# Patient Record
Sex: FEMALE | Race: WHITE | NOT HISPANIC OR LATINO | Employment: FULL TIME | URBAN - METROPOLITAN AREA
[De-identification: names, ages, dates, MRNs, and addresses within clinical notes are randomized per-mention and may not be internally consistent; named-entity substitution may affect disease eponyms.]

---

## 2020-12-03 ENCOUNTER — HOSPITAL ENCOUNTER (OUTPATIENT)
Dept: RADIOLOGY | Facility: OTHER | Age: 45
Discharge: HOME | End: 2020-12-03
Attending: INTERNAL MEDICINE
Payer: MEDICAID

## 2020-12-03 DIAGNOSIS — Z12.31 ENCOUNTER FOR SCREENING MAMMOGRAM FOR MALIGNANT NEOPLASM OF BREAST: ICD-10-CM

## 2020-12-03 PROCEDURE — 77063 BREAST TOMOSYNTHESIS BI: CPT | Mod: 26,,, | Performed by: RADIOLOGY

## 2020-12-03 PROCEDURE — 77063 MAMMO DIGITAL SCREENING BILAT WITH TOMO: ICD-10-PCS | Mod: 26,,, | Performed by: RADIOLOGY

## 2020-12-03 PROCEDURE — 77067 SCR MAMMO BI INCL CAD: CPT | Mod: TC

## 2020-12-03 PROCEDURE — 77067 SCR MAMMO BI INCL CAD: CPT | Mod: 26,,, | Performed by: RADIOLOGY

## 2020-12-03 PROCEDURE — 77067 MAMMO DIGITAL SCREENING BILAT WITH TOMO: ICD-10-PCS | Mod: 26,,, | Performed by: RADIOLOGY

## 2020-12-17 ENCOUNTER — HOSPITAL ENCOUNTER (OUTPATIENT)
Dept: RADIOLOGY | Facility: OTHER | Age: 45
Discharge: HOME | End: 2020-12-17
Attending: INTERNAL MEDICINE
Payer: MEDICAID

## 2020-12-17 DIAGNOSIS — R92.8 ABNORMAL MAMMOGRAM: ICD-10-CM

## 2020-12-17 PROCEDURE — 77061 BREAST TOMOSYNTHESIS UNI: CPT | Mod: TC,RT

## 2020-12-17 PROCEDURE — 77065 MAMMO DIGITAL DIAGNOSTIC RIGHT WITH TOMO: ICD-10-PCS | Mod: 26,RT,, | Performed by: RADIOLOGY

## 2020-12-17 PROCEDURE — 77061 BREAST TOMOSYNTHESIS UNI: CPT | Mod: 26,RT,, | Performed by: RADIOLOGY

## 2020-12-17 PROCEDURE — 77065 DX MAMMO INCL CAD UNI: CPT | Mod: 26,RT,, | Performed by: RADIOLOGY

## 2020-12-17 PROCEDURE — 76642 ULTRASOUND BREAST LIMITED: CPT | Mod: 26,RT,, | Performed by: RADIOLOGY

## 2020-12-17 PROCEDURE — 77061 MAMMO DIGITAL DIAGNOSTIC RIGHT WITH TOMO: ICD-10-PCS | Mod: 26,RT,, | Performed by: RADIOLOGY

## 2020-12-17 PROCEDURE — 76642 ULTRASOUND BREAST LIMITED: CPT | Mod: TC,RT

## 2020-12-17 PROCEDURE — 77065 DX MAMMO INCL CAD UNI: CPT | Mod: TC,RT

## 2020-12-17 PROCEDURE — 76642 US BREAST RIGHT LIMITED: ICD-10-PCS | Mod: 26,RT,, | Performed by: RADIOLOGY

## 2020-12-31 ENCOUNTER — HOSPITAL ENCOUNTER (OUTPATIENT)
Dept: RADIOLOGY | Facility: OTHER | Age: 45
Discharge: HOME | End: 2020-12-31
Attending: INTERNAL MEDICINE
Payer: MEDICAID

## 2020-12-31 DIAGNOSIS — R92.8 ABNORMAL MAMMOGRAM: ICD-10-CM

## 2020-12-31 DIAGNOSIS — N63.0 BREAST MASS: Primary | ICD-10-CM

## 2020-12-31 PROCEDURE — 88305 TISSUE EXAM BY PATHOLOGIST: ICD-10-PCS | Mod: 26,,, | Performed by: PATHOLOGY

## 2020-12-31 PROCEDURE — 25000003 PHARM REV CODE 250: Performed by: INTERNAL MEDICINE

## 2020-12-31 PROCEDURE — 19081 BX BREAST 1ST LESION STRTCTC: CPT | Mod: RT

## 2020-12-31 PROCEDURE — 19081 BX BREAST 1ST LESION STRTCTC: CPT | Mod: RT,,, | Performed by: RADIOLOGY

## 2020-12-31 PROCEDURE — 88305 TISSUE EXAM BY PATHOLOGIST: CPT | Performed by: PATHOLOGY

## 2020-12-31 PROCEDURE — 19081 MAMMO BREAST STEREOTACTIC BREAST BIOPSY RIGHT: ICD-10-PCS | Mod: RT,,, | Performed by: RADIOLOGY

## 2020-12-31 PROCEDURE — 88305 TISSUE EXAM BY PATHOLOGIST: CPT | Mod: 26,,, | Performed by: PATHOLOGY

## 2020-12-31 RX ORDER — LIDOCAINE HYDROCHLORIDE AND EPINEPHRINE 10; 10 MG/ML; UG/ML
5 INJECTION, SOLUTION INFILTRATION; PERINEURAL ONCE
Status: DISCONTINUED | OUTPATIENT
Start: 2020-12-31 | End: 2021-01-01 | Stop reason: HOSPADM

## 2020-12-31 RX ORDER — LIDOCAINE HYDROCHLORIDE AND EPINEPHRINE 10; 10 MG/ML; UG/ML
5 INJECTION, SOLUTION INFILTRATION; PERINEURAL ONCE
Status: COMPLETED | OUTPATIENT
Start: 2020-12-31 | End: 2020-12-31

## 2020-12-31 RX ORDER — LIDOCAINE HYDROCHLORIDE AND EPINEPHRINE 20; 10 MG/ML; UG/ML
20 INJECTION, SOLUTION INFILTRATION; PERINEURAL ONCE
Status: COMPLETED | OUTPATIENT
Start: 2020-12-31 | End: 2020-12-31

## 2020-12-31 RX ADMIN — LIDOCAINE HYDROCHLORIDE AND EPINEPHRINE 20 ML: 20; 10 INJECTION, SOLUTION INFILTRATION; PERINEURAL at 01:12

## 2020-12-31 RX ADMIN — LIDOCAINE HYDROCHLORIDE,EPINEPHRINE BITARTRATE 5 ML: 10; .01 INJECTION, SOLUTION INFILTRATION; PERINEURAL at 01:12

## 2021-01-04 ENCOUNTER — TELEPHONE (OUTPATIENT)
Dept: RADIOLOGY | Facility: OTHER | Age: 46
End: 2021-01-04

## 2021-01-04 LAB
FINAL PATHOLOGIC DIAGNOSIS: NORMAL
GROSS: NORMAL

## 2021-07-22 ENCOUNTER — HOSPITAL ENCOUNTER (OUTPATIENT)
Dept: RADIOLOGY | Facility: OTHER | Age: 46
Discharge: HOME | End: 2021-07-22
Attending: INTERNAL MEDICINE
Payer: MEDICAID

## 2021-07-22 DIAGNOSIS — R92.8 ABNORMAL MAMMOGRAM: ICD-10-CM

## 2021-07-22 PROCEDURE — 77065 DX MAMMO INCL CAD UNI: CPT | Mod: 26,RT,, | Performed by: RADIOLOGY

## 2021-07-22 PROCEDURE — 77061 BREAST TOMOSYNTHESIS UNI: CPT | Mod: TC,RT

## 2021-07-22 PROCEDURE — 77065 MAMMO DIGITAL DIAGNOSTIC RIGHT WITH TOMO: ICD-10-PCS | Mod: 26,RT,, | Performed by: RADIOLOGY

## 2021-07-22 PROCEDURE — 77061 BREAST TOMOSYNTHESIS UNI: CPT | Mod: 26,RT,, | Performed by: RADIOLOGY

## 2021-07-22 PROCEDURE — 77061 MAMMO DIGITAL DIAGNOSTIC RIGHT WITH TOMO: ICD-10-PCS | Mod: 26,RT,, | Performed by: RADIOLOGY

## 2022-03-11 ENCOUNTER — HOSPITAL ENCOUNTER (OUTPATIENT)
Dept: RADIOLOGY | Facility: OTHER | Age: 47
Discharge: HOME | End: 2022-03-11
Attending: INTERNAL MEDICINE
Payer: MEDICAID

## 2022-03-11 DIAGNOSIS — Z12.31 ENCOUNTER FOR SCREENING MAMMOGRAM FOR MALIGNANT NEOPLASM OF BREAST: ICD-10-CM

## 2022-03-11 PROCEDURE — 77067 SCR MAMMO BI INCL CAD: CPT | Mod: 26,,, | Performed by: RADIOLOGY

## 2022-03-11 PROCEDURE — 77063 MAMMO DIGITAL SCREENING BILAT WITH TOMO: ICD-10-PCS | Mod: 26,,, | Performed by: RADIOLOGY

## 2022-03-11 PROCEDURE — 77067 SCR MAMMO BI INCL CAD: CPT | Mod: TC

## 2022-03-11 PROCEDURE — 77063 BREAST TOMOSYNTHESIS BI: CPT | Mod: 26,,, | Performed by: RADIOLOGY

## 2022-03-11 PROCEDURE — 77067 MAMMO DIGITAL SCREENING BILAT WITH TOMO: ICD-10-PCS | Mod: 26,,, | Performed by: RADIOLOGY

## 2022-12-26 PROBLEM — S82.892A CLOSED AVULSION FRACTURE OF LEFT ANKLE: Status: ACTIVE | Noted: 2022-12-26

## 2022-12-26 PROBLEM — T14.90XA TRAUMA: Status: ACTIVE | Noted: 2022-12-26

## 2022-12-27 ENCOUNTER — TELEPHONE (OUTPATIENT)
Dept: ORTHOPEDICS | Facility: CLINIC | Age: 47
End: 2022-12-27
Payer: MEDICAID

## 2022-12-27 NOTE — TELEPHONE ENCOUNTER
"----- Message from Enrrique Campbell sent at 12/27/2022 10:35 AM CST -----  Regarding: Consult/Advisory    Name Of Caller:Self    Contact Preference?:[phone]           Provider Name:   Does patient feel the need to be seen today?           What is the nature of the call?:Pt is calling because she went to the ER and was informed that pt has a left ankle fracture           Additional Notes:  "Thank you for all that you do for our patients'"     "

## 2023-01-05 ENCOUNTER — OFFICE VISIT (OUTPATIENT)
Dept: ORTHOPEDICS | Facility: CLINIC | Age: 48
End: 2023-01-05
Payer: MEDICAID

## 2023-01-05 DIAGNOSIS — S92.155A CLOSED NONDISPLACED AVULSION FRACTURE OF LEFT TALUS, INITIAL ENCOUNTER: Primary | ICD-10-CM

## 2023-01-05 PROCEDURE — 3077F PR MOST RECENT SYSTOLIC BLOOD PRESSURE >= 140 MM HG: ICD-10-PCS | Mod: CPTII,,,

## 2023-01-05 PROCEDURE — 99214 OFFICE O/P EST MOD 30 MIN: CPT | Mod: PBBFAC,PN

## 2023-01-05 PROCEDURE — 3008F PR BODY MASS INDEX (BMI) DOCUMENTED: ICD-10-PCS | Mod: CPTII,,,

## 2023-01-05 PROCEDURE — 3080F DIAST BP >= 90 MM HG: CPT | Mod: CPTII,,,

## 2023-01-05 PROCEDURE — 3080F PR MOST RECENT DIASTOLIC BLOOD PRESSURE >= 90 MM HG: ICD-10-PCS | Mod: CPTII,,,

## 2023-01-05 PROCEDURE — 99999 PR PBB SHADOW E&M-EST. PATIENT-LVL IV: CPT | Mod: PBBFAC,,,

## 2023-01-05 PROCEDURE — 3008F BODY MASS INDEX DOCD: CPT | Mod: CPTII,,,

## 2023-01-05 PROCEDURE — 1159F PR MEDICATION LIST DOCUMENTED IN MEDICAL RECORD: ICD-10-PCS | Mod: CPTII,,,

## 2023-01-05 PROCEDURE — 1159F MED LIST DOCD IN RCRD: CPT | Mod: CPTII,,,

## 2023-01-05 PROCEDURE — 99999 PR PBB SHADOW E&M-EST. PATIENT-LVL IV: ICD-10-PCS | Mod: PBBFAC,,,

## 2023-01-05 PROCEDURE — 3077F SYST BP >= 140 MM HG: CPT | Mod: CPTII,,,

## 2023-01-05 PROCEDURE — 99204 PR OFFICE/OUTPT VISIT, NEW, LEVL IV, 45-59 MIN: ICD-10-PCS | Mod: S$PBB,,,

## 2023-01-05 PROCEDURE — 99204 OFFICE O/P NEW MOD 45 MIN: CPT | Mod: S$PBB,,,

## 2023-01-05 RX ORDER — FLUTICASONE PROPIONATE 50 MCG
SPRAY, SUSPENSION (ML) NASAL
COMMUNITY

## 2023-01-05 RX ORDER — TIZANIDINE 4 MG/1
4 TABLET ORAL 2 TIMES DAILY
COMMUNITY
Start: 2022-12-19

## 2023-01-05 RX ORDER — ALBUTEROL SULFATE 90 UG/1
AEROSOL, METERED RESPIRATORY (INHALATION)
COMMUNITY

## 2023-01-05 RX ORDER — NYSTATIN 100000 [USP'U]/G
POWDER TOPICAL
COMMUNITY

## 2023-01-05 RX ORDER — PROMETHAZINE HYDROCHLORIDE 25 MG/1
25 TABLET ORAL DAILY PRN
COMMUNITY
Start: 2022-12-19

## 2023-01-05 RX ORDER — ZOLPIDEM TARTRATE 10 MG/1
TABLET ORAL
COMMUNITY

## 2023-01-05 RX ORDER — POLYETHYLENE GLYCOL 3350 17 G/17G
POWDER, FOR SOLUTION ORAL
COMMUNITY

## 2023-01-05 NOTE — PROGRESS NOTES
Patient ID: Justino Drew is a 47 y.o. female    Fracture of the Left Ankle      History of Present Illness:    Justino Drew presents to clinic for left ankle fracture. Patient states on 12/25/2022 she slipped on ham juice while in her kitchen and fell onto her left ankle. She then went to [facility] and was found to have a small avulsion fracture of her dorsal talus. She was placed in splint, given tramadol, and told to f/u with orthopedics. The pain started 2 weeks ago and is improving. Pain is located over (points to) medial left ankle. She reports that the pain is a 6 /10 sharp pain toda. The pain is affecting ADLs and limiting desired level of activity. Denies numbness, tingling, radiation and inability to bear weight. States she is able to bear toe touch weight.     Trial of tramadol and ibuprofen with some improvement.     Ambulating: cane  Diabetic: no  Smoking: no  Hx of DVT/PE: no    PAST MEDICAL HISTORY: No past medical history on file.  PAST SURGICAL HISTORY:   Past Surgical History:   Procedure Laterality Date    CHOLECYSTECTOMY       FAMILY HISTORY: No family history on file.  SOCIAL HISTORY:   Social History     Occupational History    Not on file   Tobacco Use    Smoking status: Every Day     Packs/day: 0.50     Types: Cigarettes    Smokeless tobacco: Never   Substance and Sexual Activity    Alcohol use: Not Currently    Drug use: Not Currently    Sexual activity: Not on file        MEDICATIONS:   Current Outpatient Medications:     albuterol (PROVENTIL/VENTOLIN HFA) 90 mcg/actuation inhaler, albuterol sulfate HFA 90 mcg/actuation aerosol inhaler  USE 2 PUFFS BY MOUTH EVERY 6 HOURS AS NEEDED FOR WHEEZING AND FOR SHORTNESS OF BREATH, Disp: , Rfl:     fluticasone propionate (FLONASE) 50 mcg/actuation nasal spray, fluticasone propionate 50 mcg/actuation nasal spray,suspension  USE 1 SPRAY IN EACH NOSTRIL DAILY, Disp: , Rfl:     ibuprofen (ADVIL,MOTRIN) 800 MG tablet, Take 1 tablet (800 mg  total) by mouth every 6 (six) hours as needed for Pain., Disp: 20 tablet, Rfl: 0    levothyroxine (SYNTHROID, LEVOTHROID) 175 MCG tablet, Take 175 mcg by mouth once daily., Disp: , Rfl:     loratadine (CLARITIN) 10 mg tablet, Take 10 mg by mouth once daily., Disp: , Rfl:     montelukast (SINGULAIR) 10 mg tablet, Take 10 mg by mouth every evening., Disp: , Rfl:     nystatin (MYCOSTATIN) powder, Nyamyc 100,000 unit/gram topical powder, Disp: , Rfl:     polyethylene glycol (GLYCOLAX) 17 gram/dose powder, polyethylene glycol 3350 17 gram/dose oral powder, Disp: , Rfl:     promethazine (PHENERGAN) 25 MG tablet, Take 25 mg by mouth daily as needed., Disp: , Rfl:     tiZANidine (ZANAFLEX) 4 MG tablet, Take 4 mg by mouth 2 (two) times daily., Disp: , Rfl:     zolpidem (AMBIEN) 10 mg Tab, zolpidem 10 mg tablet, Disp: , Rfl:     albuterol-ipratropium (DUO-NEB) 2.5 mg-0.5 mg/3 mL nebulizer solution, Take 3 mLs by nebulization every 6 (six) hours as needed for Wheezing. Rescue, Disp: 1 Box, Rfl: 0  ALLERGIES: Review of patient's allergies indicates:  No Known Allergies      Physical Exam     There were no vitals filed for this visit.  Alert and oriented to person, place and time. No acute distress. Well-groomed, not ill appearing. Pupils round and reactive, normal respiratory effort, no audible wheezing.     left Foot and Ankle Exam    INSPECTION:        Gait:    antalgic     Scars:   None     Color:   Normal      Atrophy:  None      Heel / Toe Walking: Unable to assess  Moderate swelling  No abrasions or ecchymosis    ALIGNMENT:    Hindfoot  Normal  Forefoot  Normal                        TENDERNESS:       Sinus tarsi:    none    Syndesmosis:    none    ATFL:     pos           Fibula:     none  Peroneal tendons:   none        Talonavicular:    pos   Anterior tibialis:   none   Anterolateral joint line:  none  Anteromedial joint  line:  none          Deltoid:    pos    Malleolus:    none    PTT:     none    Navicular:    none       Achilles    none  Calcaneal Insertion   none  Retrocalcaneus   none        RANGE OF MOTION:     Ankle DF/PF:    15/45        Eversion/Inversion:   15/unable to assess     Midfoot ABD/ADD:   10/10     First MTP DF/PF:   60/25    STRENGTH    Peroneals:  5/5  Anterior tibialis: 4/5  Posterior tibialis: 4/5  Gastroc-soleus: 5/5  EHL:   5/5  FHL:   5/5             SPECIAL TESTS:    Anterior drawer:   Normal      (C-W contralateral side)          Forced DF/ER: No pain at syndesmosis.  Mid-leg squeeze  No pain at syndesmosis  Forced DF:  No pain anterior joint line.    Forced PF:  No pain posterior ankle.   Forced INV:  No pain lateral  Forced EV:  No pain medial   Montejos sign: Normal ankle plantar flexion.   Resisted peroneal No subluxation or pain  1st-2nd MT toggle No pain at Lisfranc      Imaging:     Left ankle  X-rays ordered/reviewed by me showing small avulsion fracture to dorsal talus.There is no obvious malalignment. No evidence of masses, lesions or foreign bodies.     Assessment & Plan    Closed nondisplaced avulsion fracture of left talus, initial encounter      I made the decision to obtain old records of the patient including previous notes and imaging. New imaging was ordered today of the extremity or extremities evaluated. I independently reviewed and interpreted the radiographs and/or MRIs/CT scan today as well as prior imaging.    We discussed at length different treatment options including conservative vs surgical management. These include anti-inflammatories, acetaminophen, rest, ice, heat, formal physical therapy including strengthening and stretching exercises, home exercise programs, injections, dry needling, and finally surgical intervention.      Will treat non-operatively with immobilization and imaging. Continue walking boot x 2 weeks, then gradually wean. RTC 3-4 weeks for repeat imaging.  She does endorse deltoid pain, if this persists can get MRI vs CT to further evaluate.     Cont tramadol or ibuprofen prn  Ice compress to the affected area 2-3x a day for 15-20 minutes as needed for pain management  Consider CT vs MRI if pain is refractory to conservative treatment    Follow up: 4 weeks  X-rays next visit: left ankle    All questions were answered and patient is agreeable to the above plan.

## 2023-01-09 VITALS
SYSTOLIC BLOOD PRESSURE: 169 MMHG | DIASTOLIC BLOOD PRESSURE: 106 MMHG | BODY MASS INDEX: 53.92 KG/M2 | HEART RATE: 73 BPM | WEIGHT: 293 LBS | HEIGHT: 62 IN

## 2023-01-26 ENCOUNTER — OFFICE VISIT (OUTPATIENT)
Dept: ORTHOPEDICS | Facility: CLINIC | Age: 48
End: 2023-01-26
Payer: MEDICAID

## 2023-01-26 VITALS
WEIGHT: 293 LBS | BODY MASS INDEX: 53.92 KG/M2 | SYSTOLIC BLOOD PRESSURE: 205 MMHG | DIASTOLIC BLOOD PRESSURE: 125 MMHG | HEART RATE: 99 BPM | HEIGHT: 62 IN

## 2023-01-26 DIAGNOSIS — S92.155D CLOSED NONDISPLACED AVULSION FRACTURE OF LEFT TALUS WITH ROUTINE HEALING, SUBSEQUENT ENCOUNTER: ICD-10-CM

## 2023-01-26 DIAGNOSIS — S93.421D SPRAIN OF DELTOID LIGAMENT OF RIGHT ANKLE, SUBSEQUENT ENCOUNTER: Primary | ICD-10-CM

## 2023-01-26 PROCEDURE — 3008F PR BODY MASS INDEX (BMI) DOCUMENTED: ICD-10-PCS | Mod: CPTII,,,

## 2023-01-26 PROCEDURE — 99213 OFFICE O/P EST LOW 20 MIN: CPT | Mod: S$PBB,,,

## 2023-01-26 PROCEDURE — 1159F MED LIST DOCD IN RCRD: CPT | Mod: CPTII,,,

## 2023-01-26 PROCEDURE — 3080F DIAST BP >= 90 MM HG: CPT | Mod: CPTII,,,

## 2023-01-26 PROCEDURE — 3008F BODY MASS INDEX DOCD: CPT | Mod: CPTII,,,

## 2023-01-26 PROCEDURE — 3077F PR MOST RECENT SYSTOLIC BLOOD PRESSURE >= 140 MM HG: ICD-10-PCS | Mod: CPTII,,,

## 2023-01-26 PROCEDURE — 99999 PR PBB SHADOW E&M-EST. PATIENT-LVL III: CPT | Mod: PBBFAC,,,

## 2023-01-26 PROCEDURE — 99213 PR OFFICE/OUTPT VISIT, EST, LEVL III, 20-29 MIN: ICD-10-PCS | Mod: S$PBB,,,

## 2023-01-26 PROCEDURE — 3077F SYST BP >= 140 MM HG: CPT | Mod: CPTII,,,

## 2023-01-26 PROCEDURE — 3080F PR MOST RECENT DIASTOLIC BLOOD PRESSURE >= 90 MM HG: ICD-10-PCS | Mod: CPTII,,,

## 2023-01-26 PROCEDURE — 99999 PR PBB SHADOW E&M-EST. PATIENT-LVL III: ICD-10-PCS | Mod: PBBFAC,,,

## 2023-01-26 PROCEDURE — 99213 OFFICE O/P EST LOW 20 MIN: CPT | Mod: PBBFAC,PN

## 2023-01-26 PROCEDURE — 1159F PR MEDICATION LIST DOCUMENTED IN MEDICAL RECORD: ICD-10-PCS | Mod: CPTII,,,

## 2023-01-26 NOTE — PROGRESS NOTES
Patient ID: Justino Drew is a 47 y.o. female    Pain of the Left Ankle      History of Present Illness:    Justino Drew presents to clinic for left ankle fracture. Patient states on 12/25/2022 she slipped on ham juice while in her kitchen and fell onto her left ankle. She then went to [facility] and was found to have a small avulsion fracture of her dorsal talus. She was placed in splint, given tramadol, and told to f/u with orthopedics. The pain started 2 weeks ago and is improving. Pain is located over (points to) medial left ankle. She reports that the pain is a 6 /10 sharp pain toda. The pain is affecting ADLs and limiting desired level of activity. Denies numbness, tingling, radiation and inability to bear weight. States she is able to bear toe touch weight.     Trial of tramadol and ibuprofen with some improvement.     Ambulating: cane  Diabetic: no  Smoking: no  Hx of DVT/PE: no    ____________________________________________________________________    Interval history 01/26/2023 : Patient returns today for follow up of left ankle fracture. States she seems to be okay if she is ambulating in the boot, but if she overdoes it or certain movements worsen her pain. States her pain is more medial, describes it as achey. She takes Ibuprofen prn.         PAST MEDICAL HISTORY: No past medical history on file.  PAST SURGICAL HISTORY:   Past Surgical History:   Procedure Laterality Date    CHOLECYSTECTOMY       FAMILY HISTORY: No family history on file.  SOCIAL HISTORY:   Social History     Occupational History    Not on file   Tobacco Use    Smoking status: Every Day     Packs/day: 0.50     Types: Cigarettes    Smokeless tobacco: Never   Substance and Sexual Activity    Alcohol use: Not Currently    Drug use: Not Currently    Sexual activity: Not on file        MEDICATIONS:   Current Outpatient Medications:     albuterol (PROVENTIL/VENTOLIN HFA) 90 mcg/actuation inhaler, albuterol sulfate HFA 90  mcg/actuation aerosol inhaler  USE 2 PUFFS BY MOUTH EVERY 6 HOURS AS NEEDED FOR WHEEZING AND FOR SHORTNESS OF BREATH, Disp: , Rfl:     fluticasone propionate (FLONASE) 50 mcg/actuation nasal spray, fluticasone propionate 50 mcg/actuation nasal spray,suspension  USE 1 SPRAY IN EACH NOSTRIL DAILY, Disp: , Rfl:     ibuprofen (ADVIL,MOTRIN) 800 MG tablet, Take 1 tablet (800 mg total) by mouth every 6 (six) hours as needed for Pain., Disp: 20 tablet, Rfl: 0    levothyroxine (SYNTHROID, LEVOTHROID) 175 MCG tablet, Take 175 mcg by mouth once daily., Disp: , Rfl:     loratadine (CLARITIN) 10 mg tablet, Take 10 mg by mouth once daily., Disp: , Rfl:     montelukast (SINGULAIR) 10 mg tablet, Take 10 mg by mouth every evening., Disp: , Rfl:     nystatin (MYCOSTATIN) powder, Nyamyc 100,000 unit/gram topical powder, Disp: , Rfl:     polyethylene glycol (GLYCOLAX) 17 gram/dose powder, polyethylene glycol 3350 17 gram/dose oral powder, Disp: , Rfl:     promethazine (PHENERGAN) 25 MG tablet, Take 25 mg by mouth daily as needed., Disp: , Rfl:     tiZANidine (ZANAFLEX) 4 MG tablet, Take 4 mg by mouth 2 (two) times daily., Disp: , Rfl:     zolpidem (AMBIEN) 10 mg Tab, zolpidem 10 mg tablet, Disp: , Rfl:     albuterol-ipratropium (DUO-NEB) 2.5 mg-0.5 mg/3 mL nebulizer solution, Take 3 mLs by nebulization every 6 (six) hours as needed for Wheezing. Rescue, Disp: 1 Box, Rfl: 0  ALLERGIES: Review of patient's allergies indicates:  No Known Allergies      Physical Exam     Vitals:    01/26/23 1436   BP: (!) 205/125   Pulse: 99     Alert and oriented to person, place and time. No acute distress. Well-groomed, not ill appearing. Pupils round and reactive, normal respiratory effort, no audible wheezing.     left Foot and Ankle Exam    INSPECTION:        Gait:    antalgic     Scars:   None     Color:   Normal      Atrophy:  None      No abrasions or ecchymosis    ALIGNMENT:    Hindfoot  Normal  Forefoot  Normal                         TENDERNESS:       Sinus tarsi:    none    Syndesmosis:    none    ATFL:     neg           Fibula:     none  Peroneal tendons:   none        Talonavicular:    pos   Anterior tibialis:   none   Anterolateral joint line:  none  Anteromedial joint line:  none          Deltoid:    pos    Malleolus:    none    PTT:     none    Navicular:    none       Achilles    none  Calcaneal Insertion   none  Retrocalcaneus   none        RANGE OF MOTION:     Ankle DF/PF:    15/45        Eversion/Inversion:   15/45     Midfoot ABD/ADD:   10/10     First MTP DF/PF:   60/25    STRENGTH    Peroneals:  5/5  Anterior tibialis: 5/5  Posterior tibialis: 5/5  Gastroc-soleus: 5/5  EHL:   5/5  FHL:   5/5             SPECIAL TESTS:    Anterior drawer:   Normal      (C-W contralateral side)          Forced DF/ER: No pain at syndesmosis.  Mid-leg squeeze  No pain at syndesmosis  Forced DF:  No pain anterior joint line.    Forced PF:  No pain posterior ankle.   Forced INV:  No pain lateral  Forced EV:  No pain medial   Montejos sign: Normal ankle plantar flexion.   Resisted peroneal No subluxation or pain  1st-2nd MT toggle No pain at Lisfranc      Imaging:     Left ankle  X-rays ordered/reviewed by me showing small avulsion fracture to dorsal talus.There is no obvious malalignment. No evidence of masses, lesions or foreign bodies.     Assessment & Plan    Sprain of deltoid ligament of right ankle, subsequent encounter    Closed nondisplaced avulsion fracture of left talus with routine healing, subsequent encounter        I made the decision to obtain old records of the patient including previous notes and imaging. New imaging was ordered today of the extremity or extremities evaluated. I independently reviewed and interpreted the radiographs and/or MRIs/CT scan today as well as prior imaging.    We discussed at length different treatment options including conservative vs surgical management. These include anti-inflammatories,  acetaminophen, rest, ice, heat, formal physical therapy including strengthening and stretching exercises, home exercise programs, injections, dry needling, and finally surgical intervention.      Patient states mild intermittent continued medial ankle pain, discussed likely deltoid sprain. No instability noted on exam, full strength and ROM, mild TTP at deltoid. Due to BMI she would not be surgical candidate, no advanced imaging at this time. May stop boot and use ankle aircast.     Cont ibuprofen prn  Aircast stirup to left ankle, may remove for bathing and laying down  Ice compress to the affected area 2-3x a day for 15-20 minutes as needed for pain management    Follow up: 6-8 weeks  X-rays next visit: none    All questions were answered and patient is agreeable to the above plan.

## 2023-03-28 ENCOUNTER — OFFICE VISIT (OUTPATIENT)
Dept: ORTHOPEDICS | Facility: CLINIC | Age: 48
End: 2023-03-28
Payer: MEDICAID

## 2023-03-28 VITALS
WEIGHT: 293 LBS | BODY MASS INDEX: 53.92 KG/M2 | HEART RATE: 75 BPM | HEIGHT: 62 IN | SYSTOLIC BLOOD PRESSURE: 202 MMHG | DIASTOLIC BLOOD PRESSURE: 96 MMHG

## 2023-03-28 DIAGNOSIS — S93.421D SPRAIN OF DELTOID LIGAMENT OF RIGHT ANKLE, SUBSEQUENT ENCOUNTER: Primary | ICD-10-CM

## 2023-03-28 PROCEDURE — 1159F PR MEDICATION LIST DOCUMENTED IN MEDICAL RECORD: ICD-10-PCS | Mod: CPTII,,,

## 2023-03-28 PROCEDURE — 3080F DIAST BP >= 90 MM HG: CPT | Mod: CPTII,,,

## 2023-03-28 PROCEDURE — 99213 OFFICE O/P EST LOW 20 MIN: CPT | Mod: S$PBB,,,

## 2023-03-28 PROCEDURE — 3080F PR MOST RECENT DIASTOLIC BLOOD PRESSURE >= 90 MM HG: ICD-10-PCS | Mod: CPTII,,,

## 2023-03-28 PROCEDURE — 3077F SYST BP >= 140 MM HG: CPT | Mod: CPTII,,,

## 2023-03-28 PROCEDURE — 99213 OFFICE O/P EST LOW 20 MIN: CPT | Mod: PBBFAC,PN

## 2023-03-28 PROCEDURE — 3077F PR MOST RECENT SYSTOLIC BLOOD PRESSURE >= 140 MM HG: ICD-10-PCS | Mod: CPTII,,,

## 2023-03-28 PROCEDURE — 3008F BODY MASS INDEX DOCD: CPT | Mod: CPTII,,,

## 2023-03-28 PROCEDURE — 3008F PR BODY MASS INDEX (BMI) DOCUMENTED: ICD-10-PCS | Mod: CPTII,,,

## 2023-03-28 PROCEDURE — 99213 PR OFFICE/OUTPT VISIT, EST, LEVL III, 20-29 MIN: ICD-10-PCS | Mod: S$PBB,,,

## 2023-03-28 PROCEDURE — 1159F MED LIST DOCD IN RCRD: CPT | Mod: CPTII,,,

## 2023-03-28 PROCEDURE — 99999 PR PBB SHADOW E&M-EST. PATIENT-LVL III: CPT | Mod: PBBFAC,,,

## 2023-03-28 PROCEDURE — 99999 PR PBB SHADOW E&M-EST. PATIENT-LVL III: ICD-10-PCS | Mod: PBBFAC,,,

## 2023-03-28 NOTE — PROGRESS NOTES
Patient ID: Justino Drew is a 47 y.o. female    Pain of the Left Foot      History of Present Illness:    Justino Drew presents to clinic for left ankle fracture. Patient states on 12/25/2022 she slipped on ham juice while in her kitchen and fell onto her left ankle. She then went to [facility] and was found to have a small avulsion fracture of her dorsal talus. She was placed in splint, given tramadol, and told to f/u with orthopedics. The pain started 2 weeks ago and is improving. Pain is located over (points to) medial left ankle. She reports that the pain is a 6 /10 sharp pain toda. The pain is affecting ADLs and limiting desired level of activity. Denies numbness, tingling, radiation and inability to bear weight. States she is able to bear toe touch weight.     Trial of tramadol and ibuprofen with some improvement.     Ambulating: cane  Diabetic: no  Smoking: no  Hx of DVT/PE: no    ____________________________________________________________________    Interval history 1/26/2023 : Patient returns today for follow up of left ankle fracture. States she seems to be okay if she is ambulating in the boot, but if she overdoes it or certain movements worsen her pain. States her pain is more medial, describes it as achey. She takes Ibuprofen prn.       ____________________________________________________________________    Interval history 3/28/2023: Patient returns today for follow up of left ankle. States she rolled her ankle about 2-3 weeks ago and wore the boot for a week, was doing well prior to this. She was unable to tolerate aircast brace. She has no other concerns today.         PAST MEDICAL HISTORY: No past medical history on file.  PAST SURGICAL HISTORY:   Past Surgical History:   Procedure Laterality Date    CHOLECYSTECTOMY       FAMILY HISTORY: No family history on file.  SOCIAL HISTORY:   Social History     Occupational History    Not on file   Tobacco Use    Smoking status: Former      Packs/day: 0.50     Types: Cigarettes     Quit date: 6/15/2022     Years since quittin.7    Smokeless tobacco: Never   Substance and Sexual Activity    Alcohol use: Not Currently    Drug use: Not Currently    Sexual activity: Not on file        MEDICATIONS:   Current Outpatient Medications:     albuterol (PROVENTIL/VENTOLIN HFA) 90 mcg/actuation inhaler, albuterol sulfate HFA 90 mcg/actuation aerosol inhaler  USE 2 PUFFS BY MOUTH EVERY 6 HOURS AS NEEDED FOR WHEEZING AND FOR SHORTNESS OF BREATH, Disp: , Rfl:     fluticasone propionate (FLONASE) 50 mcg/actuation nasal spray, fluticasone propionate 50 mcg/actuation nasal spray,suspension  USE 1 SPRAY IN EACH NOSTRIL DAILY, Disp: , Rfl:     ibuprofen (ADVIL,MOTRIN) 800 MG tablet, Take 1 tablet (800 mg total) by mouth every 6 (six) hours as needed for Pain., Disp: 20 tablet, Rfl: 0    levothyroxine (SYNTHROID, LEVOTHROID) 175 MCG tablet, Take 175 mcg by mouth once daily., Disp: , Rfl:     loratadine (CLARITIN) 10 mg tablet, Take 10 mg by mouth once daily., Disp: , Rfl:     montelukast (SINGULAIR) 10 mg tablet, Take 10 mg by mouth every evening., Disp: , Rfl:     nystatin (MYCOSTATIN) powder, Nyamyc 100,000 unit/gram topical powder, Disp: , Rfl:     polyethylene glycol (GLYCOLAX) 17 gram/dose powder, polyethylene glycol 3350 17 gram/dose oral powder, Disp: , Rfl:     promethazine (PHENERGAN) 25 MG tablet, Take 25 mg by mouth daily as needed., Disp: , Rfl:     tiZANidine (ZANAFLEX) 4 MG tablet, Take 4 mg by mouth 2 (two) times daily., Disp: , Rfl:     zolpidem (AMBIEN) 10 mg Tab, zolpidem 10 mg tablet, Disp: , Rfl:     albuterol-ipratropium (DUO-NEB) 2.5 mg-0.5 mg/3 mL nebulizer solution, Take 3 mLs by nebulization every 6 (six) hours as needed for Wheezing. Rescue, Disp: 1 Box, Rfl: 0  ALLERGIES: Review of patient's allergies indicates:  No Known Allergies      Physical Exam     Vitals:    23 1439   BP: (!) 202/96   Pulse: 75       Alert and oriented to person,  place and time. No acute distress. Well-groomed, not ill appearing. Pupils round and reactive, normal respiratory effort, no audible wheezing.     left Foot and Ankle Exam    INSPECTION:        Gait:    Normal      Scars:   None     Color:   Normal      Atrophy:  None      No abrasions or ecchymosis    ALIGNMENT:    Hindfoot  Normal  Forefoot  Normal                        TENDERNESS:       Sinus tarsi:    none    Syndesmosis:    none    ATFL:     neg           Fibula:     none  Peroneal tendons:   none        Talonavicular:    pos   Anterior tibialis:   none   Anterolateral joint line:  none  Anteromedial joint line:  none          Deltoid:    none    Malleolus:    none    PTT:     none    Navicular:    none       Achilles    none  Calcaneal Insertion   none  Retrocalcaneus   none        RANGE OF MOTION:     Ankle DF/PF:    15/45        Eversion/Inversion:   15/45     Midfoot ABD/ADD:   10/10     First MTP DF/PF:   60/25    STRENGTH    Peroneals:  5/5  Anterior tibialis: 5/5  Posterior tibialis: 5/5  Gastroc-soleus: 5/5  EHL:   5/5  FHL:   5/5             SPECIAL TESTS:    Anterior drawer:   Normal      (C-W contralateral side)          Forced DF/ER: No pain at syndesmosis.  Mid-leg squeeze  No pain at syndesmosis  Forced DF:  No pain anterior joint line.    Forced PF:  No pain posterior ankle.   Forced INV:  No pain lateral  Forced EV:  No pain medial   Montejos sign: Normal ankle plantar flexion.   Resisted peroneal No subluxation or pain  1st-2nd MT toggle No pain at Lisfranc      Imaging:     Left ankle  X-rays ordered/reviewed by me showing small avulsion fracture to dorsal talus.There is no obvious malalignment. No evidence of masses, lesions or foreign bodies.     Assessment & Plan    Sprain of deltoid ligament of right ankle, subsequent encounter      I made the decision to obtain old records of the patient including previous notes and imaging. New imaging was ordered today of the extremity or extremities  evaluated. I independently reviewed and interpreted the radiographs and/or MRIs/CT scan today as well as prior imaging.    We discussed at length different treatment options including conservative vs surgical management. These include anti-inflammatories, acetaminophen, rest, ice, heat, formal physical therapy including strengthening and stretching exercises, home exercise programs, injections, dry needling, and finally surgical intervention.      Patient states mild intermittent continued medial ankle pain, nontender on exam today. Able to ambulate unassisted with normal gait. Able to bear all weight on ankle with no pain. Patient declined formal PT. F/U prn.     Follow up: prn   X-rays next visit: none    All questions were answered and patient is agreeable to the above plan.

## 2024-03-07 DIAGNOSIS — Z12.31 ENCOUNTER FOR SCREENING MAMMOGRAM FOR MALIGNANT NEOPLASM OF BREAST: Primary | ICD-10-CM

## 2024-03-08 ENCOUNTER — TELEPHONE (OUTPATIENT)
Dept: SURGERY | Facility: CLINIC | Age: 49
End: 2024-03-08
Payer: MEDICAID

## 2024-03-08 NOTE — TELEPHONE ENCOUNTER
Called patient reference to a referral to  Ambulatory Colorectal Surgery for colon cancer screening,Patient refused.

## 2024-04-16 ENCOUNTER — HOSPITAL ENCOUNTER (OUTPATIENT)
Dept: RADIOLOGY | Facility: HOSPITAL | Age: 49
Discharge: HOME | End: 2024-04-16
Attending: INTERNAL MEDICINE
Payer: MEDICAID

## 2024-04-16 DIAGNOSIS — Z12.31 ENCOUNTER FOR SCREENING MAMMOGRAM FOR MALIGNANT NEOPLASM OF BREAST: ICD-10-CM

## 2024-04-16 PROCEDURE — 77067 SCR MAMMO BI INCL CAD: CPT | Mod: 26,,, | Performed by: RADIOLOGY

## 2024-04-16 PROCEDURE — 77067 SCR MAMMO BI INCL CAD: CPT | Mod: TC

## 2024-04-16 PROCEDURE — 77063 BREAST TOMOSYNTHESIS BI: CPT | Mod: 26,,, | Performed by: RADIOLOGY

## 2025-03-20 ENCOUNTER — TELEPHONE (OUTPATIENT)
Dept: GASTROENTEROLOGY | Facility: CLINIC | Age: 50
End: 2025-03-20
Payer: MEDICAID

## 2025-03-20 NOTE — TELEPHONE ENCOUNTER
PATIENT STATED THAT SHE WILL CALL US BACK THIS AFTERNOON AFTER 2:00 TO GET SCHEDULED FOR HER COLONOSCOPY

## 2025-03-20 NOTE — TELEPHONE ENCOUNTER
----- Message from Ayala sent at 3/19/2025  3:58 PM CDT -----  Regarding: ext colonoscopy referral  Good afternoon,Current pt is being referred from Dr Haley Case for colonoscopy. I have scanned the referral and records in to media mgr. Please contact pt to schedule and let me know if I can help any further.Thank you,[facility]ala [facility] [phone]

## 2025-03-24 DIAGNOSIS — Z87.891 PERSONAL HISTORY OF NICOTINE DEPENDENCE: Primary | ICD-10-CM
